# Patient Record
Sex: MALE | Race: BLACK OR AFRICAN AMERICAN
[De-identification: names, ages, dates, MRNs, and addresses within clinical notes are randomized per-mention and may not be internally consistent; named-entity substitution may affect disease eponyms.]

---

## 2018-01-14 ENCOUNTER — HOSPITAL ENCOUNTER (EMERGENCY)
Dept: HOSPITAL 62 - ER | Age: 33
Discharge: HOME | End: 2018-01-14
Payer: OTHER GOVERNMENT

## 2018-01-14 VITALS — DIASTOLIC BLOOD PRESSURE: 49 MMHG | SYSTOLIC BLOOD PRESSURE: 116 MMHG

## 2018-01-14 DIAGNOSIS — R09.81: Primary | ICD-10-CM

## 2018-01-14 DIAGNOSIS — F17.200: ICD-10-CM

## 2018-01-14 DIAGNOSIS — R05: ICD-10-CM

## 2018-01-14 DIAGNOSIS — R50.9: ICD-10-CM

## 2018-01-14 PROCEDURE — 87880 STREP A ASSAY W/OPTIC: CPT

## 2018-01-14 PROCEDURE — 71046 X-RAY EXAM CHEST 2 VIEWS: CPT

## 2018-01-14 PROCEDURE — 99284 EMERGENCY DEPT VISIT MOD MDM: CPT

## 2018-01-14 PROCEDURE — 87070 CULTURE OTHR SPECIMN AEROBIC: CPT

## 2018-01-14 NOTE — ER DOCUMENT REPORT
ED General





- General


Chief Complaint: Cold Symptoms


Stated Complaint: COLD SYMPTOMS


Time Seen by Provider: 01/14/18 11:58


Information source: Patient


Notes: 





32-year-old male who presents today with the onset 48 hours ago runny nose, 

congestion, cough, body aches, nausea, sore throat, with a mild frontal 

headache without neck stiffness or rash.  Patient states positive sick 

contacts.  Patient states he did have a little blood-tinged sputum when he 

coughed.  He denies any difficulty breathing or swallowing which is states pain 

with swallowing.  There is been no drooling or trismus described by patient or 

relative.


TRAVEL OUTSIDE OF THE [place] IN LAST 30 DAYS: No





- HPI


Onset: Other - See above


Onset/Duration: Gradual


Quality of pain: Achy


Severity: Mild


Pain Level: 1


Associated symptoms: Other - See above


Exacerbated by: Denies


Relieved by: Denies


Similar symptoms previously: Yes


Recently seen / treated by doctor: No





- Related Data


Allergies/Adverse Reactions: 


 





No Known Allergies Allergy (Unverified 01/14/18 11:25)


 











Past Medical History





- General


Information source: Patient





- Social History


Smoking Status: Current Every Day Smoker


Cigarette use (# per day): No


Chew tobacco use (# tins/day): No


Smoking Education Provided: No


Frequency of alcohol use: Occasional


Drug Abuse: None


Family History: Reviewed & Not Pertinent


Patient has suicidal ideation: No


Patient has homicidal ideation: No


Renal/ Medical History: Denies: Hx Peritoneal Dialysis


Past Surgical History: Reports: Hx Orthopedic Surgery - jaw, hip, nose, right 

hand





Review of Systems





- Review of Systems


Constitutional: denies: Fever


EENT: Nose congestion, Nose discharge.  denies: Eye discharge, Ear pain, Sinus 

pressure, Sinus discharge


Cardiovascular: denies: Chest pain, Palpitations


Respiratory: Cough.  denies: Short of breath


Gastrointestinal: Vomiting.  denies: Diarrhea


Genitourinary: denies: Dysuria


Musculoskeletal: denies: Leg swelling


Skin: Other - no hives.  denies: Rash


Neurological/Psychological: Other - no slurred speech


-: Yes All other systems reviewed and negative





Physical Exam





- Vital signs


Vitals: 


 











Temp Pulse Resp BP Pulse Ox


 


 101.1 F H  124 H  18   118/66   94 


 


 01/14/18 11:27  01/14/18 11:27  01/14/18 11:27  01/14/18 11:27 01/14/18 11:27











Notes: 





Reviewed vital signs and nursing note as charted by RN.





CONSTITUTIONAL: Alert and oriented and responds appropriately to questions. Well

-appearing; well-nourished





HEAD: Normocephalic; atraumatic





EYES: PERRL; Conjunctivae clear, sclerae non-icteric





ENT: Normal nose; bilateral nonpurulent nasal rhinorrhea; moist mucous membranes

; pharynx minimally erythematous with no peritonsillar swelling, midline 

nonswollen uvula present





NECK: Supple without meningismus; non-tender; full painless range of motion; no 

cervical lymphadenopathy, no masses





CARD: Regular rate and rhythm; no murmurs





RESP: Normal chest excursion without splinting or tachypnea; breath sounds 

clear and equal bilaterally; no wheezes, no rhonchi, no rales





ABD/GI: Normal bowel sounds; non-distended; soft, non-tender





BACK: The back appears normal and is non-tender to palpation, there is no CVA 

tenderness





EXT: Normal ROM in all joints; non-tender to palpation; no edema





SKIN: No acute lesions noted





NEURO: Moves all extremities equally; Motor and sensory function intact





PSYCH: The patient's mood and manner are appropriate. Grooming and personal 

hygiene are appropriate.





Course





- Re-evaluation


Re-evalutation: 





01/14/18 12:04


Given the history and physical examination with bilateral nonpurulent rhinorrhea

, sore throat, body aches, with 2 days of symptomatology, very well-appearing 

with no neck stiffness, mild frontal headache, I do believe acute bacterial 

meningitis to be extremely unlikely.  We will order an x-ray of the chest and a 

rapid strep.  Given the onset 48 hours ago I do not see the utility at this 

time of an influenza test.





01/14/18 13:14


Strep is negative





Chest x-ray shows normal heart, normal mediastinum, no fractures, normal lung 

fields, no pneumothorax.





Patient is feeling better with the Tylenol.  Vital signs are stable.  Headache 

has resolved.  Still no neck stiffness or rash.  Patient will be discharged 

home with strict return precautions and follow-up with the primary provider.





- Vital Signs


Vital signs: 


 











Temp Pulse Resp BP Pulse Ox


 


 101.1 F H  124 H  18   118/66   94 


 


 01/14/18 11:27  01/14/18 11:27 01/14/18 11:27 01/14/18 11:27  01/14/18 11:27














Discharge





- Discharge


Clinical Impression: 


 Nasal congestion, Cough





Fever


Qualifiers:


 Fever type: unspecified Qualified Code(s): R50.9 - Fever, unspecified





Condition: Good


Disposition: HOME, SELF-CARE


Additional Instructions: 


Come back immediately with any worsening cough, neck stiffness, rash, change in 

mental status, weakness or numbness, or any other acute problems.

## 2018-01-14 NOTE — RADIOLOGY REPORT (SQ)
EXAM DESCRIPTION:  CHEST PA/LAT



COMPLETED DATE/TIME:  1/14/2018 12:20 pm



REASON FOR STUDY:  40, cough and fever



COMPARISON:  None.



EXAM PARAMETERS:  NUMBER OF VIEWS: two views

TECHNIQUE: Digital Frontal and Lateral radiographic views of the chest acquired.

RADIATION DOSE: NA

LIMITATIONS: none



FINDINGS:  LUNGS AND PLEURA: No opacities, masses or pneumothorax. No pleural effusion.

MEDIASTINUM AND HILAR STRUCTURES: No masses or contour abnormalities.

HEART AND VASCULAR STRUCTURES: Heart normal size.  No evidence for failure.

BONES: No acute findings.

HARDWARE: None in the chest.

OTHER: No other significant finding.



IMPRESSION:  NO SIGNIFICANT RADIOGRAPHIC FINDING IN THE CHEST.



TECHNICAL DOCUMENTATION:  JOB ID:  [id number]

 2011 [employer]- All Rights Reserved

## 2019-01-11 ENCOUNTER — HOSPITAL ENCOUNTER (EMERGENCY)
Dept: HOSPITAL 62 - ER | Age: 34
Discharge: HOME | End: 2019-01-11
Payer: COMMERCIAL

## 2019-01-11 VITALS — SYSTOLIC BLOOD PRESSURE: 165 MMHG | DIASTOLIC BLOOD PRESSURE: 76 MMHG
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
## 2019-01-11 DIAGNOSIS — S61.411A: Primary | ICD-10-CM

## 2019-01-11 PROCEDURE — 99283 EMERGENCY DEPT VISIT LOW MDM: CPT

## 2019-01-11 PROCEDURE — 12001 RPR S/N/AX/GEN/TRNK 2.5CM/<: CPT

## 2019-01-11 PROCEDURE — 73130 X-RAY EXAM OF HAND: CPT

## 2019-01-11 PROCEDURE — S0119 ONDANSETRON 4 MG: HCPCS

## 2019-01-11 NOTE — RADIOLOGY REPORT (SQ)
EXAM DESCRIPTION: 



XR HAND 3 OR MORE VIEWS



COMPLETED DATE/TME:  01/11/2019 04:52



CLINICAL HISTORY: 



33 years, Male, drill injury to hand, swelling, pain, wound



COMPARISON:

None.



NUMBER OF VIEWS:

3



TECHNIQUE:

3 view right hand



LIMITATIONS:

None.



FINDINGS:



Soft tissue swelling with minimal soft tissue gas along the

dorsal ulnar aspect of the hand. No acute osseous abnormality. No

radiopaque foreign body.



IMPRESSION:



Soft tissue injury with no acute osseous abnormality

 



copyright 2011 [employer]- All Rights Reserved

## 2019-01-11 NOTE — ER DOCUMENT REPORT
ED Wound





- General


Chief Complaint: Laceration


Stated Complaint: HAND INJURY


Time Seen by Provider: 01/11/19 04:47


Notes: 


Patient is a 33-year-old male that comes emergency department for chief 

complaint of right hand injury and wound.  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 

hydraulic machine, he states he hit his hand against a drill bit portion of the 

machine, this caused a laceration, pain, swelling.  He states that he put some 

sort of glue over the wound and then bandaged it with Coban from a first-aid 

kit, afterwards his fifth digit started going numb, he did make the bandage very

tight.  He is not on blood thinners, he denies history of diabetes.  Tetanus is 

up-to-date.  Denies any other complaints.





TRAVEL OUTSIDE OF THE [place] IN LAST 30 DAYS: No





- Related Data


Allergies/Adverse Reactions: 


                                        





No Known Allergies Allergy (Unverified 01/14/18 11:25)


   











Past Medical History





- General


Information source: Patient





- Social History


Smoking Status: Never Smoker


Frequency of alcohol use: None


Drug Abuse: None


Lives with: Family


Family History: Reviewed & Not Pertinent


Renal/ Medical History: Denies: Hx Peritoneal Dialysis


Musculoskeletal Medical History: Reports Hx Musculoskeletal Deformity, Reports 

Hx Musculoskeletal Trauma


Past Surgical History: Reports: Hx Orthopedic Surgery - jaw, hip, nose, right 

hand





- Immunizations


Immunizations up to date: Yes


Hx Diphtheria, Pertussis, Tetanus Vaccination: Yes





Review of Systems





- Review of Systems


Constitutional: No symptoms reported


EENT: No symptoms reported


Cardiovascular: No symptoms reported


Respiratory: No symptoms reported


Gastrointestinal: No symptoms reported


Genitourinary: No symptoms reported


Male Genitourinary: No symptoms reported


Musculoskeletal: See HPI


Skin: See HPI


Hematologic/Lymphatic: No symptoms reported


Neurological/Psychological: No symptoms reported





Physical Exam





- Vital signs


Vitals: 


                                        











Temp Pulse Resp BP Pulse Ox


 


 98.3 F   96   22 H  165/76 H  95 


 


 01/11/19 04:38  01/11/19 04:38  01/11/19 04:38  01/11/19 04:38  01/11/19 04:38














- Notes


Notes: 





GENERAL: Alert, interacts well. No acute distress.


HEAD: Normocephalic, atraumatic.


EYES: Pupils equal, round, and reactive to light. Extraocular movements intact.


ENT: Oral mucosa moist, tongue midline. Oropharynx unremarkable. Airway patent. 

Nares patent, no nasal septal hematoma, TM's intact.


NECK: Full range of motion. Supple. Trachea midline.


LUNGS: Clear to auscultation bilaterally, no wheezes, rales, or rhonchi. No 

respiratory distress.


HEART: Regular rate and rhythm. No murmur


ABDOMEN: Soft, non-tender. Non-distended. Bowel sounds present in all 4 quadra

nts.


GENITOURINARY: Deferred


EXTREMITIES: Soft tissue swelling of the right hand with a 1.5 cm irregular 

laceration horizontally over the dorsum of the hand.  This is over the fourth 

and fifth MCP area.  Range of motion of the hand intact, sensation intact, 

capillary refill intact.  Patient reporting chronic slight weakness of the fifth

digit, reports previous injury, states baseline.  No other injuries noted.  

Normal wrist, snuffbox, forearm, elbow exam.


BACK: no cervical, thoracic, lumbar midline tenderness. No saddle anesthesia, 

normal distal neurovascular exam. 


NEUROLOGICAL: Alert and oriented x3. Normal speech. [cranial nerves II through 

XII grossly intact]. 


PSYCH: Normal affect, normal mood.


SKIN: Warm, dry, normal turgor. No rashes or lesions noted.





Course





- Re-evaluation


Re-evalutation: 


X-rays negative for acute findings.  I cleaned out all of the glue that patient 

placed over this, irrigated, explored, closed the wound.  Placed on prophylactic

antibiotic.  Provided with work-release.  Discussed wound care and return 

precautions.  Patient states understanding and agreement.





- Vital Signs


Vital signs: 


                                        











Temp Pulse Resp BP Pulse Ox


 


 98.3 F   96   22 H  165/76 H  95 


 


 01/11/19 04:38  01/11/19 04:38  01/11/19 04:38  01/11/19 04:38  01/11/19 04:38














Procedures





- Laceration/Wound Repair


  ** Right dorsal hand


Wound length (cm): 1.5


Wound's Depth, Shape: Irregular


Laceration pre-procedure: Sterile PPE donned, Sterile drapes applied, Shur-Clens

applied


Anesthetic type: 1% Lidocaine


Volume Anesthetic (mLs): 4


Wound explored: Clean, No foreign body removed


Irrigated w/ Saline (mLs): 30


Wound Repaired With: Sutures


Suture Size/Type: 5:0, Nylon


Number of Sutures: 3


Layer Closure?: No


Post-procedure wound care: Sterile dressing applied


Post-procedure NV exam normal: Yes


Complications: No





Discharge





- Discharge


Clinical Impression: 


Laceration of right hand


Qualifiers:


 Encounter type: initial encounter Foreign body presence: without foreign body 

Qualified Code(s): S61.411A - Laceration without foreign body of right hand, 

initial encounter





Injury of right hand


Qualifiers:


 Encounter type: initial encounter Qualified Code(s): S69.91XA - Unspecified 

injury of right wrist, hand and finger(s), initial encounter





Condition: Stable


Disposition: HOME, SELF-CARE


Additional Instructions: 


X-ray does not show fracture or foreign body.  The sutures can come out in 5-7 

days.  Keep clean, clean with soap and water, avoid soaking, dab dry.





Take prophylactic antibiotics as prescribed.  You can take Tylenol, ibuprofen, 

or both at the same time for pain.  This can be done up to every 6 hours (1000 

mg Tylenol and 600 mg ibuprofen). 





Follow-up with primary care.  Return immediately for any concerning symptoms 

including developing pain, redness, discolored discharge, fever/chills, or any 

other concerning symptoms.





Prescriptions: 


Cephalexin Monohydrate [Keflex 500 mg Capsule] 500 mg PO TID 5 Days #15 capsule


Forms:  Return to Work

## 2019-06-04 ENCOUNTER — HOSPITAL ENCOUNTER (INPATIENT)
Dept: HOSPITAL 62 - ER | Age: 34
LOS: 4 days | Discharge: HOME | DRG: 195 | End: 2019-06-08
Attending: INTERNAL MEDICINE | Admitting: INTERNAL MEDICINE
Payer: COMMERCIAL

## 2019-06-04 DIAGNOSIS — R91.8: ICD-10-CM

## 2019-06-04 DIAGNOSIS — K59.00: ICD-10-CM

## 2019-06-04 DIAGNOSIS — J18.9: Primary | ICD-10-CM

## 2019-06-04 DIAGNOSIS — F17.290: ICD-10-CM

## 2019-06-04 DIAGNOSIS — Z86.11: ICD-10-CM

## 2019-06-04 LAB
A TYPE INFLUENZA AG: NEGATIVE
ADD MANUAL DIFF: NO
ALBUMIN SERPL-MCNC: 4.4 G/DL (ref 3.5–5)
ALP SERPL-CCNC: 141 U/L (ref 38–126)
ALT SERPL-CCNC: 63 U/L (ref 21–72)
ANION GAP SERPL CALC-SCNC: 12 MMOL/L (ref 5–19)
APPEARANCE UR: CLEAR
APTT PPP: (no result) S
AST SERPL-CCNC: 43 U/L (ref 17–59)
B INFLUENZA AG: NEGATIVE
BASOPHILS # BLD AUTO: 0.1 10^3/UL (ref 0–0.2)
BASOPHILS NFR BLD AUTO: 0.6 % (ref 0–2)
BILIRUB DIRECT SERPL-MCNC: 0.5 MG/DL (ref 0–0.4)
BILIRUB SERPL-MCNC: 1.4 MG/DL (ref 0.2–1.3)
BILIRUB UR QL STRIP: NEGATIVE
BUN SERPL-MCNC: 11 MG/DL (ref 7–20)
CALCIUM: 9.9 MG/DL (ref 8.4–10.2)
CHLORIDE SERPL-SCNC: 96 MMOL/L (ref 98–107)
CO2 SERPL-SCNC: 31 MMOL/L (ref 22–30)
EOSINOPHIL # BLD AUTO: 0 10^3/UL (ref 0–0.6)
EOSINOPHIL NFR BLD AUTO: 0.2 % (ref 0–6)
ERYTHROCYTE [DISTWIDTH] IN BLOOD BY AUTOMATED COUNT: 12.7 % (ref 11.5–14)
GLUCOSE SERPL-MCNC: 104 MG/DL (ref 75–110)
GLUCOSE UR STRIP-MCNC: NEGATIVE MG/DL
HCT VFR BLD CALC: 40.4 % (ref 37.9–51)
HGB BLD-MCNC: 14.1 G/DL (ref 13.5–17)
KETONES UR STRIP-MCNC: NEGATIVE MG/DL
LYMPHOCYTES # BLD AUTO: 1.7 10^3/UL (ref 0.5–4.7)
LYMPHOCYTES NFR BLD AUTO: 13 % (ref 13–45)
MCH RBC QN AUTO: 28.6 PG (ref 27–33.4)
MCHC RBC AUTO-ENTMCNC: 34.8 G/DL (ref 32–36)
MCV RBC AUTO: 82 FL (ref 80–97)
MONOCYTES # BLD AUTO: 1.7 10^3/UL (ref 0.1–1.4)
MONOCYTES NFR BLD AUTO: 12.5 % (ref 3–13)
NEUTROPHILS # BLD AUTO: 9.9 10^3/UL (ref 1.7–8.2)
NEUTS SEG NFR BLD AUTO: 73.7 % (ref 42–78)
NITRITE UR QL STRIP: NEGATIVE
PH UR STRIP: 5 [PH] (ref 5–9)
PLATELET # BLD: 288 10^3/UL (ref 150–450)
POTASSIUM SERPL-SCNC: 3.7 MMOL/L (ref 3.6–5)
PROT SERPL-MCNC: 7.8 G/DL (ref 6.3–8.2)
PROT UR STRIP-MCNC: NEGATIVE MG/DL
RBC # BLD AUTO: 4.92 10^6/UL (ref 4.35–5.55)
SODIUM SERPL-SCNC: 138.5 MMOL/L (ref 137–145)
SP GR UR STRIP: 1.03
TOTAL CELLS COUNTED % (AUTO): 100 %
UROBILINOGEN UR-MCNC: 4 MG/DL (ref ?–2)
WBC # BLD AUTO: 13.4 10^3/UL (ref 4–10.5)

## 2019-06-04 PROCEDURE — 87206 SMEAR FLUORESCENT/ACID STAI: CPT

## 2019-06-04 PROCEDURE — S0119 ONDANSETRON 4 MG: HCPCS

## 2019-06-04 PROCEDURE — 94640 AIRWAY INHALATION TREATMENT: CPT

## 2019-06-04 PROCEDURE — 94799 UNLISTED PULMONARY SVC/PX: CPT

## 2019-06-04 PROCEDURE — 85610 PROTHROMBIN TIME: CPT

## 2019-06-04 PROCEDURE — 86701 HIV-1ANTIBODY: CPT

## 2019-06-04 PROCEDURE — 88342 IMHCHEM/IMCYTCHM 1ST ANTB: CPT

## 2019-06-04 PROCEDURE — 87101 SKIN FUNGI CULTURE: CPT

## 2019-06-04 PROCEDURE — 85730 THROMBOPLASTIN TIME PARTIAL: CPT

## 2019-06-04 PROCEDURE — 81001 URINALYSIS AUTO W/SCOPE: CPT

## 2019-06-04 PROCEDURE — 31628 BRONCHOSCOPY/LUNG BX EACH: CPT

## 2019-06-04 PROCEDURE — 85025 COMPLETE CBC W/AUTO DIFF WBC: CPT

## 2019-06-04 PROCEDURE — 80053 COMPREHEN METABOLIC PANEL: CPT

## 2019-06-04 PROCEDURE — S0164 INJECTION PANTROPRAZOLE: HCPCS

## 2019-06-04 PROCEDURE — 87015 SPECIMEN INFECT AGNT CONCNTJ: CPT

## 2019-06-04 PROCEDURE — 87070 CULTURE OTHR SPECIMN AEROBIC: CPT

## 2019-06-04 PROCEDURE — 71260 CT THORAX DX C+: CPT

## 2019-06-04 PROCEDURE — 88305 TISSUE EXAM BY PATHOLOGIST: CPT

## 2019-06-04 PROCEDURE — 36415 COLL VENOUS BLD VENIPUNCTURE: CPT

## 2019-06-04 PROCEDURE — 71045 X-RAY EXAM CHEST 1 VIEW: CPT

## 2019-06-04 PROCEDURE — 85652 RBC SED RATE AUTOMATED: CPT

## 2019-06-04 PROCEDURE — 87040 BLOOD CULTURE FOR BACTERIA: CPT

## 2019-06-04 PROCEDURE — 99284 EMERGENCY DEPT VISIT MOD MDM: CPT

## 2019-06-04 PROCEDURE — 87116 MYCOBACTERIA CULTURE: CPT

## 2019-06-04 PROCEDURE — 82962 GLUCOSE BLOOD TEST: CPT

## 2019-06-04 PROCEDURE — 88341 IMHCHEM/IMCYTCHM EA ADD ANTB: CPT

## 2019-06-04 PROCEDURE — 74022 RADEX COMPL AQT ABD SERIES: CPT

## 2019-06-04 PROCEDURE — 31622 DX BRONCHOSCOPE/WASH: CPT

## 2019-06-04 PROCEDURE — 87804 INFLUENZA ASSAY W/OPTIC: CPT

## 2019-06-04 PROCEDURE — 87205 SMEAR GRAM STAIN: CPT

## 2019-06-04 PROCEDURE — 76000 FLUOROSCOPY <1 HR PHYS/QHP: CPT

## 2019-06-04 RX ADMIN — PIPERACILLIN AND TAZOBACTAM SCH MLS/HR: 3; .375 INJECTION, POWDER, LYOPHILIZED, FOR SOLUTION INTRAVENOUS; PARENTERAL at 23:55

## 2019-06-04 RX ADMIN — Medication SCH ML: at 23:56

## 2019-06-04 RX ADMIN — HEPARIN SODIUM SCH UNIT: 5000 INJECTION, SOLUTION INTRAVENOUS; SUBCUTANEOUS at 23:55

## 2019-06-04 NOTE — RADIOLOGY REPORT (SQ)
EXAM DESCRIPTION:  CT CHEST WITH



COMPLETED DATE/TIME:  6/4/2019 6:08 pm



REASON FOR STUDY:  radiologist recommended masslike structure rul



COMPARISON:  Same day chest radiograph



TECHNIQUE:  CT scan of the chest performed using helical scanning technique with dynamic intravenous 
contrast injection.  Images reviewed with lung, soft tissue and bone windows.  Reconstructed coronal 
and sagittal MPR and MIP images reviewed.  All images stored on PACS.

All CT scanners at this facility use dose modulation, iterative reconstruction, and/or weight based d
osing when appropriate to reduce radiation dose to as low as reasonably achievable (ALARA).

[facility]: Dose Right  [facility]: CareDose    [facility]: Dose Right    [facility]: Teradose 4D    [facility]: Smart Technologies



CONTRAST TYPE AND DOSE:  contrast/concentration: Isovue 350.00 mg/ml; Total Contrast Delivered: 80.0 
ml; Total Saline Delivered: 55.0 ml



RENAL FUNCTION:  None required. The patient is less than 50 years old.



RADIATION DOSE:  CT Rad equipment meets quality standard of care and radiation dose reduction techniq
ues were employed. CTDIvol: 19.8 mGy. DLP: 754 mGy-cm. .



LIMITATIONS:  None.



FINDINGS:  LUNGS AND PLEURA: There is a dense masslike consolidation of the apical right upper lobe i
n keeping with findings of prior radiograph.

HILAR AND MEDIASTINAL STRUCTURES: No identified masses or abnormal nodes.

HEART AND VASCULAR STRUCTURES: No aneurysm or dissection.  No central pulmonary emboli.  No pericardi
al effusion.

HARDWARE: None in the chest.

UPPER ABDOMEN: No significant findings.  Limited exam.

THYROID AND OTHER SOFT TISSUES: No masses.  No adenopathy.

BONES: No significant finding.

OTHER: No other significant finding.



IMPRESSION:  There is a dense, masslike consolidation of the apical right upper lobe in keeping with 
findings of prior radiograph.  This may be infectious in nature with the appropriate clinical present
ation, however underlying mass is not excluded.  Recommend follow-up radiographs in 6 to 8 weeks to d
ocument complete resolution.  Bronchoscopic or percutaneous tissue sampling may be pursued if there i
s no infectious presentation.



TECHNICAL DOCUMENTATION:  JOB ID:  [id number]

Quality ID # [id number]: Final reports with documentation of one or more dose reduction techniques (e.g., Au
tomated exposure control, adjustment of the mA and/or kV according to patient size, use of iterative 
reconstruction technique)

 2011 [employer]- All Rights Reserved



Reading location - IP/workstation name: [email or web address]

## 2019-06-04 NOTE — ER DOCUMENT REPORT
ED Medical Screen (RME)





- General


Chief Complaint: Flu Symptoms


Stated Complaint: VOMITING/FEVER


Time Seen by Provider: 06/04/19 15:53


Mode of Arrival: Ambulatory


Information source: Patient


Notes: 





Patient presents emergency department with complaints of headache fever vomiting

nausea and abdominal pain since Sunday.  Reports fever up to 103.  Reports he 

took some nausea medicine yesterday.  Has not taken anything today.  No other 

complaints abdomen tender to palpate











I have greeted and performed a rapid initial assessment of this patient.  A 

comprehensive ED assessment and evaluation of the patient, analysis of test 

results and completion of the medical decision making process will be conducted 

by additional ED providers.


Dictation of this chart was performed using voice recognition software; 

therefore, there may be some unintended grammatical errors.


TRAVEL OUTSIDE OF THE [place] IN LAST 30 DAYS: No





- Related Data


Allergies/Adverse Reactions: 


                                        





No Known Allergies Allergy (Verified 06/04/19 14:43)


   











Past Medical History





- Social History


Chew tobacco use (# tins/day): No


Frequency of alcohol use: Social


Drug Abuse: None


Renal/ Medical History: Denies: Hx Peritoneal Dialysis


Musculoskeltal Medical History: Reports Hx Musculoskeletal Deformity, Reports Hx

Musculoskeletal Trauma


Past Surgical History: Reports: Hx Orthopedic Surgery - jaw, hip, nose, right 

hand





- Immunizations


Immunizations up to date: Yes


Hx Diphtheria, Pertussis, Tetanus Vaccination: Yes





Physical Exam





- Vital signs


Vitals: 





                                        











Temp Pulse Resp BP Pulse Ox


 


 100.2 F   103 H  16   122/52 L  94 


 


 06/04/19 15:02  06/04/19 15:02  06/04/19 15:02  06/04/19 15:02  06/04/19 15:02














Course





- Vital Signs


Vital signs: 





                                        











Temp Pulse Resp BP Pulse Ox


 


 100.2 F   103 H  16   122/52 L  94 


 


 06/04/19 15:02  06/04/19 15:02  06/04/19 15:02  06/04/19 15:02  06/04/19 15:02
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
## 2019-06-04 NOTE — RADIOLOGY REPORT (SQ)
EXAM DESCRIPTION:  ACUTE ABDOMEN SERIES



COMPLETED DATE/TIME:  6/4/2019 5:10 pm



REASON FOR STUDY:  Abdominal pain



COMPARISON:  Chest radiograph, 1/14/2018



NUMBER OF VIEWS:  Three views.



TECHNIQUE:  Frontal chest, supine abdomen and upright/decubitus abdomen radiographic images acquired.




LIMITATIONS:  None.



FINDINGS:  CHEST: There is a large masslike opacity of the suprahilar right upper lobe measuring at l
east 6.5 cm.

FREE AIR: None. No abnormal gas collections.

BOWEL GAS PATTERN: Nonobstructive pattern. No dilated loops or air fluid levels.

CALCIFICATIONS: No suspicious calcifications.

HARDWARE: None in the abdomen.

SOFT TISSUES: No gross mass or suggestion of organomegaly.

BONES: No acute fracture.  No worrisome bone lesions.

OTHER: No other significant finding.



IMPRESSION:  1.  Large masslike opacity of the suprahilar right upper lobe measuring at least 6.5 cm.
  Recommend contrast-enhanced CT to further evaluate.

2.  Nonobstructive pattern of bowel gas with gas and stool present to the rectum.  No free air in the
 abdomen.



TECHNICAL DOCUMENTATION:  JOB ID:  [id number]

 2011 [employer]- All Rights Reserved



Reading location - IP/workstation name: [email or web address]

## 2019-06-05 LAB
ADD MANUAL DIFF: NO
ALBUMIN SERPL-MCNC: 3.9 G/DL (ref 3.5–5)
ALP SERPL-CCNC: 130 U/L (ref 38–126)
ALT SERPL-CCNC: 56 U/L (ref 21–72)
ANION GAP SERPL CALC-SCNC: 11 MMOL/L (ref 5–19)
AST SERPL-CCNC: 32 U/L (ref 17–59)
BASOPHILS # BLD AUTO: 0.1 10^3/UL (ref 0–0.2)
BASOPHILS NFR BLD AUTO: 0.5 % (ref 0–2)
BILIRUB DIRECT SERPL-MCNC: 0.6 MG/DL (ref 0–0.4)
BILIRUB SERPL-MCNC: 1.5 MG/DL (ref 0.2–1.3)
BUN SERPL-MCNC: 11 MG/DL (ref 7–20)
CALCIUM: 9.5 MG/DL (ref 8.4–10.2)
CHLORIDE SERPL-SCNC: 97 MMOL/L (ref 98–107)
CO2 SERPL-SCNC: 32 MMOL/L (ref 22–30)
EOSINOPHIL # BLD AUTO: 0.1 10^3/UL (ref 0–0.6)
EOSINOPHIL NFR BLD AUTO: 0.6 % (ref 0–6)
ERYTHROCYTE [DISTWIDTH] IN BLOOD BY AUTOMATED COUNT: 12.7 % (ref 11.5–14)
GLUCOSE SERPL-MCNC: 103 MG/DL (ref 75–110)
HCT VFR BLD CALC: 38.2 % (ref 37.9–51)
HGB BLD-MCNC: 13.1 G/DL (ref 13.5–17)
LYMPHOCYTES # BLD AUTO: 1.6 10^3/UL (ref 0.5–4.7)
LYMPHOCYTES NFR BLD AUTO: 15.9 % (ref 13–45)
MCH RBC QN AUTO: 28.1 PG (ref 27–33.4)
MCHC RBC AUTO-ENTMCNC: 34.4 G/DL (ref 32–36)
MCV RBC AUTO: 82 FL (ref 80–97)
MONOCYTES # BLD AUTO: 1.4 10^3/UL (ref 0.1–1.4)
MONOCYTES NFR BLD AUTO: 14 % (ref 3–13)
NEUTROPHILS # BLD AUTO: 6.9 10^3/UL (ref 1.7–8.2)
NEUTS SEG NFR BLD AUTO: 69 % (ref 42–78)
PLATELET # BLD: 277 10^3/UL (ref 150–450)
POTASSIUM SERPL-SCNC: 3.3 MMOL/L (ref 3.6–5)
PROT SERPL-MCNC: 7 G/DL (ref 6.3–8.2)
RBC # BLD AUTO: 4.66 10^6/UL (ref 4.35–5.55)
SODIUM SERPL-SCNC: 140.2 MMOL/L (ref 137–145)
TOTAL CELLS COUNTED % (AUTO): 100 %
WBC # BLD AUTO: 10 10^3/UL (ref 4–10.5)

## 2019-06-05 RX ADMIN — Medication SCH ML: at 05:15

## 2019-06-05 RX ADMIN — PIPERACILLIN AND TAZOBACTAM SCH MLS/HR: 3; .375 INJECTION, POWDER, LYOPHILIZED, FOR SOLUTION INTRAVENOUS; PARENTERAL at 22:10

## 2019-06-05 RX ADMIN — IPRATROPIUM BROMIDE AND ALBUTEROL SULFATE SCH ML: 2.5; .5 SOLUTION RESPIRATORY (INHALATION) at 01:25

## 2019-06-05 RX ADMIN — HEPARIN SODIUM SCH UNIT: 5000 INJECTION, SOLUTION INTRAVENOUS; SUBCUTANEOUS at 17:01

## 2019-06-05 RX ADMIN — Medication SCH ML: at 22:11

## 2019-06-05 RX ADMIN — PANTOPRAZOLE SODIUM SCH MG: 40 INJECTION, POWDER, FOR SOLUTION INTRAVENOUS at 09:58

## 2019-06-05 RX ADMIN — IPRATROPIUM BROMIDE AND ALBUTEROL SULFATE SCH ML: 2.5; .5 SOLUTION RESPIRATORY (INHALATION) at 08:13

## 2019-06-05 RX ADMIN — PIPERACILLIN AND TAZOBACTAM SCH MLS/HR: 3; .375 INJECTION, POWDER, LYOPHILIZED, FOR SOLUTION INTRAVENOUS; PARENTERAL at 05:15

## 2019-06-05 RX ADMIN — IPRATROPIUM BROMIDE AND ALBUTEROL SULFATE SCH ML: 2.5; .5 SOLUTION RESPIRATORY (INHALATION) at 17:19

## 2019-06-05 RX ADMIN — PIPERACILLIN AND TAZOBACTAM SCH MLS/HR: 3; .375 INJECTION, POWDER, LYOPHILIZED, FOR SOLUTION INTRAVENOUS; PARENTERAL at 09:57

## 2019-06-05 RX ADMIN — PIPERACILLIN AND TAZOBACTAM SCH MLS/HR: 3; .375 INJECTION, POWDER, LYOPHILIZED, FOR SOLUTION INTRAVENOUS; PARENTERAL at 17:01

## 2019-06-05 RX ADMIN — PANTOPRAZOLE SODIUM SCH MG: 40 INJECTION, POWDER, FOR SOLUTION INTRAVENOUS at 00:56

## 2019-06-05 RX ADMIN — HEPARIN SODIUM SCH UNIT: 5000 INJECTION, SOLUTION INTRAVENOUS; SUBCUTANEOUS at 22:11

## 2019-06-05 RX ADMIN — HEPARIN SODIUM SCH UNIT: 5000 INJECTION, SOLUTION INTRAVENOUS; SUBCUTANEOUS at 05:15

## 2019-06-05 RX ADMIN — Medication SCH ML: at 17:02
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
## 2019-06-05 NOTE — PROGRESS NOTE E
Progress Note



NAME: EJ TERRY JR

MRN: [id number]

: 1985             AGE: 34Y

DATE:  2019           ROOM: 335



SUBJECTIVE:

The patient is a 34-year-old -American male who came with nausea,

vomiting, and fever.  The temperature was 102 degrees Fahrenheit.  X-ray

showing a mass like density in the right upper lobe.  CT scan confirmed

the mass like density involving the right upper lobe about 4.5 x 4 cm

size.  The patient claimed that he is doing well over the last 24 hours. 

Still coughing yellow green phlegm.  Denies any hemoptysis, chest pain, or

worsening dyspnea.  Denies any nausea, vomiting, or diarrhea.



OBJECTIVE:

GENERAL:  The patient is awake, alert, coherent, oriented x3.



VITAL SIGNS:  Temperature of 99.7 degrees Fahrenheit with a T-max of 99.7

degrees Fahrenheit, blood pressure is 120/57, heart rate of 97,

respiratory is 16, saturation 97% on room air.



EYES:  No jaundice or pallor.



EARS, NOSE, AND THROAT:  No ear drainage.  No nasal discharge.



CHEST AND LUNGS:  No wheezing, no rhonchi, no coarse crackles.



CARDIOVASCULAR:  S1, S2 distinct.  Normal rate and regular rhythm.



ABDOMEN:  Flabby, positive bowel sounds, soft, nondistended, nontender.



EXTREMITIES:  No joint swelling, no cellulitis.



LABORATORY DATA:

Bacterial culture was sent yesterday, but sputum AFB culture and smear was

not sent yesterday, it was sent this morning.  We will repeat the sputum

AFB again tonight for the second sputum AFB culture and then the third one

will be tomorrow morning to help reduce the length of stay of the patient.

The sputum AFB culture tonight will be as good as the one in the morning,to make
3 sets of sputum AFB culture.



ASSESSMENT:

1. LUNG MASS/CONSOLIDATION RIGHT UPPER LOBE, POSSIBLE PNEUMONIC PROCESS,

POSSIBLE ATYPICAL MYCOBACTERIUM INFECTION VERSUS UNDERLYING MALIGNANCY. 

RULE OUT PULMONARY TUBERCULOSIS.



PLAN/RECOMMENDATION:

1.  We will do a sputum AFB culture tonight and another sputum AFB culture

tomorrow morning.  Once all 3 sets of sputum AFB cultures are negative,

the respiratory isolation can be discontinued.

2.  We will plan to continue IV Zosyn and vancomycin for now until we have

the results of the smears/ cultures.

3.  We will plan to do a bronchoscopy Friday.



If you have any questions, please feel free to call me.





DICTATING PHYSICIAN:  FAROOQ THOMPSON M.D.





[id number]                  DT: 2019

PHY#: [id number]            DD: 2019

ID:   [id number]           JOB#: [id number]       ACCT: [id number]



cc:

>







MTDD

## 2019-06-06 LAB
ADD MANUAL DIFF: NO
ALBUMIN SERPL-MCNC: 3.9 G/DL (ref 3.5–5)
ALP SERPL-CCNC: 119 U/L (ref 38–126)
ALT SERPL-CCNC: 40 U/L (ref 21–72)
ANION GAP SERPL CALC-SCNC: 11 MMOL/L (ref 5–19)
APTT BLD: 37.1 SEC (ref 23.5–35.8)
AST SERPL-CCNC: 26 U/L (ref 17–59)
BASOPHILS # BLD AUTO: 0 10^3/UL (ref 0–0.2)
BASOPHILS NFR BLD AUTO: 0.4 % (ref 0–2)
BILIRUB DIRECT SERPL-MCNC: 0.3 MG/DL (ref 0–0.4)
BILIRUB SERPL-MCNC: 0.6 MG/DL (ref 0.2–1.3)
BUN SERPL-MCNC: 10 MG/DL (ref 7–20)
CALCIUM: 9.6 MG/DL (ref 8.4–10.2)
CHLORIDE SERPL-SCNC: 99 MMOL/L (ref 98–107)
CO2 SERPL-SCNC: 32 MMOL/L (ref 22–30)
EOSINOPHIL # BLD AUTO: 0.1 10^3/UL (ref 0–0.6)
EOSINOPHIL NFR BLD AUTO: 0.8 % (ref 0–6)
ERYTHROCYTE [DISTWIDTH] IN BLOOD BY AUTOMATED COUNT: 13.1 % (ref 11.5–14)
GLUCOSE SERPL-MCNC: 115 MG/DL (ref 75–110)
HCT VFR BLD CALC: 37.5 % (ref 37.9–51)
HGB BLD-MCNC: 12.9 G/DL (ref 13.5–17)
INR PPP: 1.03
LYMPHOCYTES # BLD AUTO: 1.7 10^3/UL (ref 0.5–4.7)
LYMPHOCYTES NFR BLD AUTO: 16.7 % (ref 13–45)
MCH RBC QN AUTO: 28.4 PG (ref 27–33.4)
MCHC RBC AUTO-ENTMCNC: 34.4 G/DL (ref 32–36)
MCV RBC AUTO: 83 FL (ref 80–97)
MONOCYTES # BLD AUTO: 1.2 10^3/UL (ref 0.1–1.4)
MONOCYTES NFR BLD AUTO: 12.1 % (ref 3–13)
NEUTROPHILS # BLD AUTO: 7.1 10^3/UL (ref 1.7–8.2)
NEUTS SEG NFR BLD AUTO: 70 % (ref 42–78)
PLATELET # BLD: 348 10^3/UL (ref 150–450)
POTASSIUM SERPL-SCNC: 3.9 MMOL/L (ref 3.6–5)
PROT SERPL-MCNC: 7 G/DL (ref 6.3–8.2)
PROTHROMBIN TIME: 14 SEC (ref 11.4–15.4)
RBC # BLD AUTO: 4.54 10^6/UL (ref 4.35–5.55)
SODIUM SERPL-SCNC: 141.8 MMOL/L (ref 137–145)
TOTAL CELLS COUNTED % (AUTO): 100 %
WBC # BLD AUTO: 10.2 10^3/UL (ref 4–10.5)

## 2019-06-06 RX ADMIN — IPRATROPIUM BROMIDE AND ALBUTEROL SULFATE SCH ML: 2.5; .5 SOLUTION RESPIRATORY (INHALATION) at 16:42

## 2019-06-06 RX ADMIN — HEPARIN SODIUM SCH UNIT: 5000 INJECTION, SOLUTION INTRAVENOUS; SUBCUTANEOUS at 05:59

## 2019-06-06 RX ADMIN — PIPERACILLIN AND TAZOBACTAM SCH MLS/HR: 3; .375 INJECTION, POWDER, LYOPHILIZED, FOR SOLUTION INTRAVENOUS; PARENTERAL at 11:09

## 2019-06-06 RX ADMIN — HEPARIN SODIUM SCH: 5000 INJECTION, SOLUTION INTRAVENOUS; SUBCUTANEOUS at 22:50

## 2019-06-06 RX ADMIN — HEPARIN SODIUM SCH: 5000 INJECTION, SOLUTION INTRAVENOUS; SUBCUTANEOUS at 14:33

## 2019-06-06 RX ADMIN — IPRATROPIUM BROMIDE AND ALBUTEROL SULFATE SCH ML: 2.5; .5 SOLUTION RESPIRATORY (INHALATION) at 07:54

## 2019-06-06 RX ADMIN — Medication SCH ML: at 06:00

## 2019-06-06 RX ADMIN — PIPERACILLIN AND TAZOBACTAM SCH MLS/HR: 3; .375 INJECTION, POWDER, LYOPHILIZED, FOR SOLUTION INTRAVENOUS; PARENTERAL at 20:54

## 2019-06-06 RX ADMIN — PIPERACILLIN AND TAZOBACTAM SCH MLS/HR: 3; .375 INJECTION, POWDER, LYOPHILIZED, FOR SOLUTION INTRAVENOUS; PARENTERAL at 03:29

## 2019-06-06 RX ADMIN — Medication SCH: at 22:50

## 2019-06-06 RX ADMIN — Medication PRN EACH: at 23:14

## 2019-06-06 RX ADMIN — POLYETHYLENE GLYCOL 3350 SCH: 17 POWDER, FOR SOLUTION ORAL at 11:08

## 2019-06-06 RX ADMIN — Medication SCH ML: at 15:59

## 2019-06-06 RX ADMIN — PANTOPRAZOLE SODIUM SCH MG: 40 INJECTION, POWDER, FOR SOLUTION INTRAVENOUS at 11:10

## 2019-06-06 RX ADMIN — HEPARIN SODIUM SCH: 5000 INJECTION, SOLUTION INTRAVENOUS; SUBCUTANEOUS at 06:01

## 2019-06-06 RX ADMIN — IPRATROPIUM BROMIDE AND ALBUTEROL SULFATE SCH ML: 2.5; .5 SOLUTION RESPIRATORY (INHALATION) at 00:31

## 2019-06-06 RX ADMIN — PIPERACILLIN AND TAZOBACTAM SCH MLS/HR: 3; .375 INJECTION, POWDER, LYOPHILIZED, FOR SOLUTION INTRAVENOUS; PARENTERAL at 15:59

## 2019-06-06 NOTE — PROGRESS NOTE
Provider Note


Provider Note: 


ID Consult Note





Asked to review chart. Pt is a 34 year old obese male smoker who previously 

served in [facility]. He was admitted on 6/5/19 with 3 days of fever and 

productive cough associated with headache, body aches, nausea, vomiting. He has 

a history of what sounds like latent tuberculosis infection that was treated in 

2012. His lung exam has been noted to be relatively unremarkable. Acute 

abdominal series was performed in the ED that showed a large masslike opacitiy 

in the suprahilera RUL. CT scan showed a dense masslike consolidation of the 

apical RUL. HIV Ag/Ab test was negative. Influenza antigen test was negative. Pt

was placed on airborne precautions and 3 AFB smears at this point are negative. 

BCx negative x 1 day. He has empirically been given vancomycin and Zosyn. Sputum

culture is pending. 





Impression/Recommendations


With regard to the question of pulmonary TB, his management has been entirely 

appropriate, with airborne precautions until AFB sputum smears could be obtained

and found negative x 3. With acute onset of symptoms and AFB smears x 3 negative

despite immunocompetence and a rather impressive pulmonary lesion, pulmonary TB 

seems much less likely. Airborne precautions can be discontinued.





For community acquired pneumonia, Rocephin/azithromycin would be recommended. 

Vancomycin and Zosyn is broader than is likely necessary and the combination of 

these agents may be associated with increased risk for nephrotoxicity. 





With prominent GI symptoms on presentation, Legionella could be considered. Pt 

is a smoker, and it is more common in warmer months, but it tends to affect 

elderly patients who have more comorbidities and debility. Other supportive but 

nonspecific features, such as hyponatremia or transaminitis, to suggest 

Legionlla are absent. It would be covered by azithromycin. Legionella urine 

antigen can be sent.  





Edwin Renteria MD


[facility] Infectious Diseases, 


pager [phone]

## 2019-06-06 NOTE — PROGRESS NOTE E
Progress Note



NAME: EJ TERRY JR

MRN: [id number]

: 1985             AGE: 34Y

DATE: 2019                    ROOM: 335



SUBJECTIVE:

Patient is a 34-year-old  male who came in with a fever, chills

and coughing yellow-greenish sputum, with a mass-like density involving

the right upper lobe, treated for pneumonia with broad

spectrum antibiotics - IV Zosyn and IV vancomycin.  Sputum bacteria culture

is still pending.  AFB smear negative x3.  Blood cultures no growth after

48 hours.  Patient has been doing well.  White count went down to 10,000

yesterday.  Denies any hemoptysis, chest pain or increasing dyspnea or

worsening dyspnea.  No nausea, vomiting or diarrhea.



OBJECTIVE:

GENERAL:  Patient awake, alert, coherent, oriented x3.  Not in apparent

respiratory distress.



VITAL SIGNS:  Afebrile with a temperature of 98.5, with a T-max of 99.6. 

Blood pressure is 140/77, heart rate is 107, respirations 20 and

saturation 96% on room air.



EYES:  No jaundice or pallor.



EARS, NOSE AND THROAT:  No ear drainage.  No nasal discharge.



CHEST AND LUNGS:  No wheezing, no rhonchi, no coarse crackles.



CARDIOVASCULAR:  S1, S2 distinct.  Normal rate, regular rhythm..



ABDOMEN:  Flabby.  Positive bowel sounds.  Soft, nondistended.



EXTREMITIES:  No joint swelling or cellulitis.



LABORATORY DATA:

There was no CBC or chemistry done today.



ASSESSMENT:

LUNG MASS-LIKE DENSITY, RIGHT UPPER LOBE, POSSIBLY INFLAMMATORY

PROCESS/PNEUMONIC PROCESS.UNDERLYING MALIGNANCY COULD NOT BE COMPLETELY 
EXCLUDED.  Currently on Zosyn and IV vancomycin.  



PLAN/RECOMMENDATIONS:

1.   Patient is scheduled for flexible bronchoscopy tomorrow at 12 noon.

2.   Will put the patient n.p.o. at 4:00AM tomorrow.

3.   Will continue IV Zosyn and IV vancomycin for now.  Will consider

     discontinuing one of the IV antibiotics tomorrow.

4.   Will do CBC, Chem-12, PT/PTT tonight.





DICTATING PHYSICIAN:  FAROOQ THOMPSON M.D.





[id number]                  DT: 2019

PHY#: [id number]            DD: 2019

ID:   [id number]           JOB#: [id number]       ACCT: [id number]



cc:

>







MTDD

## 2019-06-06 NOTE — PDOC PROGRESS REPORT
Subjective


Progress Note for:: 06/05/19


Subjective:: 





Actually resting fairly comfortably.  Still has not had a normal bowel movement.


Reason For Visit: 


LUNG MASS PNEUMONIA








Physical Exam


Vital Signs: 


                                        











Temp Pulse Resp BP Pulse Ox


 


 99 F   98   14   132/67 H  95 


 


 06/05/19 09:00  06/05/19 08:14  06/05/19 11:01  06/05/19 11:01  06/05/19 11:01








                                 Intake & Output











 06/04/19 06/05/19 06/06/19





 06:59 06:59 06:59


 


Intake Total  1230 


 


Balance  1230 


 


Weight  119.1 kg 











General appearance: PRESENT: no acute distress, cooperative, morbidly obese, 

well-developed


Head exam: PRESENT: atraumatic, normocephalic


Eye exam: PRESENT: conjunctiva pink.  ABSENT: scleral icterus


Ear exam: PRESENT: normal external ear exam


Mouth exam: PRESENT: moist, tongue midline


Respiratory exam: PRESENT: chest wall tenderness, symmetrical, unlabored.  

ABSENT: accessory muscle use, rales, rhonchi, tachypnea, wheezes


Cardiovascular exam: PRESENT: RRR, +S1, +S2


GI/Abdominal exam: PRESENT: normal bowel sounds, soft, tenderness - Mild diffuse

nonspecific tenderness, other - Protuberant abdomen


Rectal exam: PRESENT: deferred


Gentrourinary exam: ABSENT: indwelling catheter


Extremities exam: ABSENT: pedal edema


Neurological exam: PRESENT: alert, awake, oriented to person, oriented to place,

oriented to time, oriented to situation, CN II-XII grossly intact


Psychiatric exam: PRESENT: appropriate affect, normal mood.  ABSENT: agitated, 

anxious


Focused psych exam: ABSENT: delusional, restlessness





Results


Laboratory Results: 


                                        





                                 06/05/19 05:55 





                                 06/05/19 05:55 





                                        











  06/04/19 06/04/19 06/04/19





  16:00 16:00 18:20


 


WBC  13.4 H  


 


RBC  4.92  


 


Hgb  14.1  


 


Hct  40.4  


 


MCV  82  


 


MCH  28.6  


 


MCHC  34.8  


 


RDW  12.7  


 


Plt Count  288  


 


Seg Neutrophils %  73.7  


 


Lymphocytes %  13.0  


 


Monocytes %  12.5  


 


Eosinophils %  0.2  


 


Basophils %  0.6  


 


Absolute Neutrophils  9.9 H  


 


Absolute Lymphocytes  1.7  


 


Absolute Monocytes  1.7 H  


 


Absolute Eosinophils  0.0  


 


Absolute Basophils  0.1  


 


Sodium   138.5 


 


Potassium   3.7 


 


Chloride   96 L 


 


Carbon Dioxide   31 H 


 


Anion Gap   12 


 


BUN   11 


 


Creatinine   0.98 


 


Est GFR ( Amer)   > 60 


 


Est GFR (Non-Af Amer)   > 60 


 


Glucose   104 


 


Calcium   9.9 


 


Total Bilirubin   1.4 H 


 


AST   43 


 


ALT   63 


 


Alkaline Phosphatase   141 H 


 


Total Protein   7.8 


 


Albumin   4.4 


 


Urine Color    RAYMOND


 


Urine Appearance    CLEAR


 


Urine pH    5.0


 


Ur Specific Gravity    1.034


 


Urine Protein    NEGATIVE


 


Urine Glucose (UA)    NEGATIVE


 


Urine Ketones    NEGATIVE


 


Urine Blood    MODERATE H


 


Urine Nitrite    NEGATIVE


 


Ur Leukocyte Esterase    NEGATIVE


 


Urine WBC (Auto)    1


 


Urine RBC (Auto)    6














  06/05/19 06/05/19





  05:55 05:55


 


WBC  10.0 


 


RBC  4.66 


 


Hgb  13.1 L 


 


Hct  38.2 


 


MCV  82 


 


MCH  28.1 


 


MCHC  34.4 


 


RDW  12.7 


 


Plt Count  277 


 


Seg Neutrophils %  69.0 


 


Lymphocytes %  15.9 


 


Monocytes %  14.0 H 


 


Eosinophils %  0.6 


 


Basophils %  0.5 


 


Absolute Neutrophils  6.9 


 


Absolute Lymphocytes  1.6 


 


Absolute Monocytes  1.4 


 


Absolute Eosinophils  0.1 


 


Absolute Basophils  0.1 


 


Sodium   140.2


 


Potassium   3.3 L


 


Chloride   97 L


 


Carbon Dioxide   32 H


 


Anion Gap   11


 


BUN   11


 


Creatinine   1.12


 


Est GFR ( Amer)   > 60


 


Est GFR (Non-Af Amer)   > 60


 


Glucose   103


 


Calcium   9.5


 


Total Bilirubin   1.5 H


 


AST   32


 


ALT   56


 


Alkaline Phosphatase   130 H


 


Total Protein   7.0


 


Albumin   3.9


 


Urine Color  


 


Urine Appearance  


 


Urine pH  


 


Ur Specific Gravity  


 


Urine Protein  


 


Urine Glucose (UA)  


 


Urine Ketones  


 


Urine Blood  


 


Urine Nitrite  


 


Ur Leukocyte Esterase  


 


Urine WBC (Auto)  


 


Urine RBC (Auto)  











Impressions: 


                                        





Acute Abdomen Series  06/04/19 16:36


IMPRESSION:  1.  Large masslike opacity of the suprahilar right upper lobe 

measuring at least 6.5 cm.  Recommend contrast-enhanced CT to further evaluate.


2.  Nonobstructive pattern of bowel gas with gas and stool present to the 

rectum.  No free air in the abdomen.


 








Chest CT  06/04/19 17:44


IMPRESSION:  There is a dense, masslike consolidation of the apical right upper 

lobe in keeping with findings of prior radiograph.  This may be infectious in 

nature with the appropriate clinical presentation, however underlying mass is 

not excluded.  Recommend follow-up radiographs in 6 to 8 weeks to document 

complete resolution.  Bronchoscopic or percutaneous tissue sampling may be 

pursued if there is no infectious presentation.


 














Assessment and Plan





- Diagnosis


(1) Mass of upper lobe of right lung


Is this a current diagnosis for this admission?: Yes   


Plan: 


With a history of tuberculosis (that has been treated) there is a question of 

the mass being a tuberculoma.  Acid-fast smears, fungal culture and sputum 

culture have been requested.  The patient is on respiratory isolation at this 

point.  I have reached out to pharmacy to have Dr. Renteria of the infectious 

disease service at [facility] review the case.








(2) History of TB (tuberculosis)


Is this a current diagnosis for this admission?: Yes   


Plan: 


The patient has had treatment for tuberculosis.  If the AFB smears are positive 

then this lesion could be a tuberculoma.  If not then oncology should see the 

patient.








(3) Pneumonia


Qualifiers: 


   Pneumonia type: due to Pneumococcus   Laterality: right   Lung location: u

pper lobe of lung   Qualified Code(s): J13 - Pneumonia due to Streptococcus 

pneumoniae   


Is this a current diagnosis for this admission?: Yes   


Plan: 


The Gram stain revealed gram-positive cocci as well as small amount of gram-

negative and gram-positive bacilli.  The patient is currently on levofloxacin 

for community-acquired pneumonia.  AFB and fungal smears have been ordered as 

well.








(4) Constipation


Qualifiers: 


   Constipation type: unspecified constipation type   Qualified Code(s): K59.00 

- Constipation, unspecified   


Is this a current diagnosis for this admission?: Yes   


Plan: 


The patient reports that he has not had a significant bowel movement for 3 to 4 

days.  I have ordered Dulcolax tablets as well as a bottle of citrate of 

magnesia.  This could be contributing to some of his discomfort.








- Time


Time Spent with patient: Less than 15 minutes


Medications reviewed and adjusted accordingly: Yes

## 2019-06-06 NOTE — PDOC PROGRESS REPORT
Subjective


Progress Note for:: 06/06/19


Subjective:: 


This is 74 years old black male patient with no significant past medical history

except for had been treated for tuberculosis in 2012.  Presented with 3 days 

history of cough of sputum, fever and mild shortness of breath.  His CT scan and

chest x-ray revealed dense masslike consolidation in the right upper lobe.  He 

has also mild leukocytosis of 13,000 which is today it is 10,000.  Patient 

reported some improvement.  He has been started on vancomycin and Zosyn emp

irically.  His sputum Gram stain culture and AFB are pending.  Of note patient 

had been assigned in [facility] 2012 and upon [place] return to [place] diagnosed with 

most probably with latent tuberculosis and treated with anti-tuberculosis.  

Patient reports his history was diagnosed based on PPD otherwise he did not have

signs and symptoms of tuberculosis.


Reason For Visit: 


LUNG MASS PNEUMONIA








Physical Exam


Vital Signs: 


                                        











Temp Pulse Resp BP Pulse Ox


 


 98.3 F   98   18   143/81 H  92 


 


 06/06/19 04:09  06/06/19 07:58  06/06/19 07:58  06/06/19 07:58  06/06/19 07:58








                                 Intake & Output











 06/05/19 06/06/19 06/07/19





 06:59 06:59 06:59


 


Intake Total 1230 1140 


 


Balance 1230 1140 


 


Weight 119.1 kg 119.4 kg 











Respiratory exam: PRESENT: crackles, decreased breath sounds - Right upper lobe





Results


Laboratory Results: 


                                        





                                 06/05/19 05:55 





                                 06/05/19 05:55 








Impressions: 


                                        





Acute Abdomen Series  06/04/19 16:36


IMPRESSION:  1.  Large masslike opacity of the suprahilar right upper lobe 

measuring at least 6.5 cm.  Recommend contrast-enhanced CT to further evaluate.


2.  Nonobstructive pattern of bowel gas with gas and stool present to the 

rectum.  No free air in the abdomen.


 








Chest CT  06/04/19 17:44


IMPRESSION:  There is a dense, masslike consolidation of the apical right upper 

lobe in keeping with findings of prior radiograph.  This may be infectious in 

nature with the appropriate clinical presentation, however underlying mass is 

not excluded.  Recommend follow-up radiographs in 6 to 8 weeks to document 

complete resolution.  Bronchoscopic or percutaneous tissue sampling may be 

pursued if there is no infectious presentation.


 














Assessment and Plan





- Diagnosis


(1) Right upper lobe pneumonia


Is this a current diagnosis for this admission?: Yes   


Plan: 


Patient has leukocytosis, fever and cough so the right upper lobe consolidation 

most probably can be infectious origin.


We will continue the current antibiotics and follow-up his lab.








(2) Constipation


Is this a current diagnosis for this admission?: Yes   


Plan: 


Resolved








(3) History of tuberculosis


Is this a current diagnosis for this admission?: Yes   


Plan: 


Most probably it is latent tuberculosis since patient did not have any signs 

symptoms compatible with tuberculosis.  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 

[place].

## 2019-06-07 PROCEDURE — 0B9C8ZX DRAINAGE OF RIGHT UPPER LUNG LOBE, VIA NATURAL OR ARTIFICIAL OPENING ENDOSCOPIC, DIAGNOSTIC: ICD-10-PCS | Performed by: INTERNAL MEDICINE

## 2019-06-07 PROCEDURE — 0BDC8ZX EXTRACTION OF RIGHT UPPER LUNG LOBE, VIA NATURAL OR ARTIFICIAL OPENING ENDOSCOPIC, DIAGNOSTIC: ICD-10-PCS | Performed by: INTERNAL MEDICINE

## 2019-06-07 PROCEDURE — 0BJ08ZZ INSPECTION OF TRACHEOBRONCHIAL TREE, VIA NATURAL OR ARTIFICIAL OPENING ENDOSCOPIC: ICD-10-PCS | Performed by: INTERNAL MEDICINE

## 2019-06-07 RX ADMIN — Medication PRN EACH: at 21:38

## 2019-06-07 RX ADMIN — MIDAZOLAM HYDROCHLORIDE ONE MG: 1 INJECTION, SOLUTION INTRAMUSCULAR; INTRAVENOUS at 13:13

## 2019-06-07 RX ADMIN — HEPARIN SODIUM SCH: 5000 INJECTION, SOLUTION INTRAVENOUS; SUBCUTANEOUS at 22:26

## 2019-06-07 RX ADMIN — PIPERACILLIN AND TAZOBACTAM SCH MLS/HR: 3; .375 INJECTION, POWDER, LYOPHILIZED, FOR SOLUTION INTRAVENOUS; PARENTERAL at 09:06

## 2019-06-07 RX ADMIN — POLYETHYLENE GLYCOL 3350 SCH: 17 POWDER, FOR SOLUTION ORAL at 09:07

## 2019-06-07 RX ADMIN — Medication SCH: at 05:16

## 2019-06-07 RX ADMIN — Medication SCH: at 18:38

## 2019-06-07 RX ADMIN — HEPARIN SODIUM SCH: 5000 INJECTION, SOLUTION INTRAVENOUS; SUBCUTANEOUS at 18:38

## 2019-06-07 RX ADMIN — MIDAZOLAM HYDROCHLORIDE ONE MG: 1 INJECTION, SOLUTION INTRAMUSCULAR; INTRAVENOUS at 13:12

## 2019-06-07 RX ADMIN — MIDAZOLAM HYDROCHLORIDE ONE MG: 1 INJECTION, SOLUTION INTRAMUSCULAR; INTRAVENOUS at 13:14

## 2019-06-07 RX ADMIN — FENTANYL CITRATE ONE MCG: 50 INJECTION INTRAMUSCULAR; INTRAVENOUS at 13:12

## 2019-06-07 RX ADMIN — IPRATROPIUM BROMIDE AND ALBUTEROL SULFATE SCH ML: 2.5; .5 SOLUTION RESPIRATORY (INHALATION) at 08:23

## 2019-06-07 RX ADMIN — PIPERACILLIN AND TAZOBACTAM SCH: 3; .375 INJECTION, POWDER, LYOPHILIZED, FOR SOLUTION INTRAVENOUS; PARENTERAL at 18:39

## 2019-06-07 RX ADMIN — HEPARIN SODIUM SCH: 5000 INJECTION, SOLUTION INTRAVENOUS; SUBCUTANEOUS at 05:16

## 2019-06-07 RX ADMIN — PIPERACILLIN AND TAZOBACTAM SCH MLS/HR: 3; .375 INJECTION, POWDER, LYOPHILIZED, FOR SOLUTION INTRAVENOUS; PARENTERAL at 21:28

## 2019-06-07 RX ADMIN — Medication SCH ML: at 21:38

## 2019-06-07 RX ADMIN — FENTANYL CITRATE ONE MCG: 50 INJECTION INTRAMUSCULAR; INTRAVENOUS at 13:23

## 2019-06-07 RX ADMIN — IPRATROPIUM BROMIDE AND ALBUTEROL SULFATE SCH ML: 2.5; .5 SOLUTION RESPIRATORY (INHALATION) at 00:33

## 2019-06-07 RX ADMIN — MIDAZOLAM HYDROCHLORIDE ONE MG: 1 INJECTION, SOLUTION INTRAMUSCULAR; INTRAVENOUS at 13:10

## 2019-06-07 RX ADMIN — IPRATROPIUM BROMIDE AND ALBUTEROL SULFATE SCH: 2.5; .5 SOLUTION RESPIRATORY (INHALATION) at 16:38

## 2019-06-07 RX ADMIN — MIDAZOLAM HYDROCHLORIDE ONE MG: 1 INJECTION, SOLUTION INTRAMUSCULAR; INTRAVENOUS at 13:16

## 2019-06-07 RX ADMIN — MIDAZOLAM HYDROCHLORIDE ONE MG: 1 INJECTION, SOLUTION INTRAMUSCULAR; INTRAVENOUS at 12:43

## 2019-06-07 RX ADMIN — MIDAZOLAM HYDROCHLORIDE ONE MG: 1 INJECTION, SOLUTION INTRAMUSCULAR; INTRAVENOUS at 13:11

## 2019-06-07 RX ADMIN — MIDAZOLAM HYDROCHLORIDE ONE MG: 1 INJECTION, SOLUTION INTRAMUSCULAR; INTRAVENOUS at 13:18

## 2019-06-07 RX ADMIN — MIDAZOLAM HYDROCHLORIDE ONE MG: 1 INJECTION, SOLUTION INTRAMUSCULAR; INTRAVENOUS at 13:22

## 2019-06-07 RX ADMIN — PIPERACILLIN AND TAZOBACTAM SCH MLS/HR: 3; .375 INJECTION, POWDER, LYOPHILIZED, FOR SOLUTION INTRAVENOUS; PARENTERAL at 03:18

## 2019-06-07 RX ADMIN — Medication PRN EACH: at 05:54

## 2019-06-07 RX ADMIN — MIDAZOLAM HYDROCHLORIDE ONE MG: 1 INJECTION, SOLUTION INTRAMUSCULAR; INTRAVENOUS at 13:17

## 2019-06-07 RX ADMIN — FENTANYL CITRATE ONE MCG: 50 INJECTION INTRAMUSCULAR; INTRAVENOUS at 13:20

## 2019-06-07 RX ADMIN — PANTOPRAZOLE SODIUM SCH MG: 40 INJECTION, POWDER, FOR SOLUTION INTRAVENOUS at 09:07

## 2019-06-07 NOTE — OPERATIVE REPORT E
Operative Report



NAME: EJ TERRY JR

MRN:  [id number]          : 1985 AGE:  34Y

DATE OF SURGERY: 2019               ROOM: 335





INDICATION FOR PROCEDURE:

The patient is a 34-year-old male who came in with nausea, vomiting, and a

large right upper lobe mass about 6 x 4 cm size.  Initially treated for

pneumonia with IV Zosyn and vancomycin.  Appeared to improve.  White count

went down from 12,000 to 10,000.  The patient is rescheduled for flexible

bronchoscopy with lung biopsy in the operating room. The patient had an

attempt for bronchoscopy in the endoscopy unit earlier; however, the

patient vomited the first time after getting the lidocaine nebulizer, and

vomited the second time while sedated on 5.5 mg of Versed and 75 mcg of

fentanyl.  Was able to protect his airway.  Suctioned the gastric secretions

immediately and reinspected the airway and looks clean and no apparent

aspiration noted.  The flexible bronchoscopy under conscious (moderate )  
sedation was

aborted, and patient was scheduled for flexible bronchoscopy with anesthesia 
support

with patient endotracheally intubated and mechanically ventilated

during the general anesthesia.



SURGEON:

FAROOQ THOMPSON M.D.



ANESTHESIA SERVICE:   DR ROTH & LEONARDO CHEW



DETAILS OF PROCEDURE:

Flexible bronchoscope was inserted through endotracheal tube adapter, and the 
main shira appeared normal. 

Left main stem bronchus and left segmental airway look normal.  There are

some purulent secretions noted on the left upper lobe and the left lower

lobe.  A moderate amount of purulent endotracheal tube secretions was

suctioned from the right upper lobe.  The apical bronchus appeared

stenotic.  Purulent bronchial  secretions were suctioned until it is clear.  No

apparent endobronchial tumor was noted on the right upper lobe bronchus. 

Bronchial biopsy x4 was performed in the apical segment of the right upper

lobe under flouroscopic guidance.  Some small amount of bleeding was noted,

which appeared to resolve after cold saline application.  No active

bleeding was noted at the termination of procedure.  No apparent pneumothorax 
was noted after the bronchoscopy. 





DICTATING PHYSICIAN:  FAROOQ THOMPSON MD,ANDREW,MPH





[id number]                  DT: 2019    1903

PHY#: [id number]            DD: 2019    1640

ID:   [id number]           JOB#: [id number]       ACCT: [id number]



cc:FAROOQ THOMPSON M.D.

>







VENITA

## 2019-06-07 NOTE — RADIOLOGY REPORT (SQ)
EXAM DESCRIPTION:  CHEST SINGLE VIEW



COMPLETED DATE/TIME:  6/7/2019 4:58 pm



REASON FOR STUDY:  S/P Lung Biopsy, right upper lobe



COMPARISON:  CT 6/4/2019 chest x-ray 1/14/2018



EXAM PARAMETERS:  NUMBER OF VIEWS: One view.

TECHNIQUE: Single frontal radiographic view of the chest acquired.

RADIATION DOSE: NA

LIMITATIONS: None.



FINDINGS:  LUNGS AND PLEURA: Considerable opacification in the right upper lobe.  No pneumothorax.

MEDIASTINUM AND HILAR STRUCTURES: No masses.  Contour normal.

HEART AND VASCULAR STRUCTURES: Heart normal in size.  Normal vasculature.

BONES: No acute findings.

HARDWARE: None in the chest.

OTHER: No other significant finding.



IMPRESSION:  No pneumothorax status post lung biopsy.



TECHNICAL DOCUMENTATION:  JOB ID:  [id number]

 2011 [employer]- All Rights Reserved



Reading location - IP/workstation name: [email or web address]

## 2019-06-07 NOTE — PDOC PROGRESS REPORT
Subjective


Progress Note for:: 06/07/19


Subjective:: 


Patient is seen and examined at bedside.  He is awake alert oriented.  No new 

complaints.  Patient is scheduled for bronchoscopy this morning.





Reason For Visit: 


LUNG MASS PNEUMONIA








Physical Exam


Vital Signs: 


                                        











Temp Pulse Resp BP Pulse Ox


 


 98.8 F   92   22 H  127/66 H  93 


 


 06/07/19 07:19  06/07/19 13:55  06/07/19 13:55  06/07/19 13:55  06/07/19 13:55








                                 Intake & Output











 06/06/19 06/07/19 06/08/19





 06:59 06:59 06:59


 


Intake Total 1140 1550 700


 


Balance 1140 1550 700


 


Weight 119.4 kg 122 kg 











General appearance: PRESENT: no acute distress


Head exam: PRESENT: atraumatic


Neck exam: ABSENT: carotid bruit, JVD, lymphadenopathy, thyromegaly


Respiratory exam: PRESENT: crackles - Right lung, decreased breath sounds


Cardiovascular exam: PRESENT: RRR.  ABSENT: diastolic murmur, rubs, systolic 

murmur


Neurological exam: PRESENT: alert, awake, oriented to person, oriented to place,

oriented to time, oriented to situation





Results


Laboratory Results: 


                                        





                                 06/06/19 21:12 





                                 06/06/19 21:12 





                                        











  06/06/19 06/06/19





  21:12 21:12


 


WBC  10.2 


 


RBC  4.54 


 


Hgb  12.9 L 


 


Hct  37.5 L 


 


MCV  83 


 


MCH  28.4 


 


MCHC  34.4 


 


RDW  13.1 


 


Plt Count  348 


 


Seg Neutrophils %  70.0 


 


Lymphocytes %  16.7 


 


Monocytes %  12.1 


 


Eosinophils %  0.8 


 


Basophils %  0.4 


 


Absolute Neutrophils  7.1 


 


Absolute Lymphocytes  1.7 


 


Absolute Monocytes  1.2 


 


Absolute Eosinophils  0.1 


 


Absolute Basophils  0.0 


 


Sodium   141.8


 


Potassium   3.9


 


Chloride   99


 


Carbon Dioxide   32 H


 


Anion Gap   11


 


BUN   10


 


Creatinine   1.25


 


Est GFR ( Amer)   > 60


 


Est GFR (Non-Af Amer)   > 60


 


Glucose   115 H


 


Calcium   9.6


 


Total Bilirubin   0.6


 


AST   26


 


ALT   40


 


Alkaline Phosphatase   119


 


Total Protein   7.0


 


Albumin   3.9








                                        





06/05/19 08:37   Sputum   Fungal Smear - Final


06/05/19 08:37   Sputum   Fungal Smear - Final








Impressions: 


                                        





Acute Abdomen Series  06/04/19 16:36


IMPRESSION:  1.  Large masslike opacity of the suprahilar right upper lobe 

measuring at least 6.5 cm.  Recommend contrast-enhanced CT to further evaluate.


2.  Nonobstructive pattern of bowel gas with gas and stool present to the 

rectum.  No free air in the abdomen.


 








Chest CT  06/04/19 17:44


IMPRESSION:  There is a dense, masslike consolidation of the apical right upper 

lobe in keeping with findings of prior radiograph.  This may be infectious in 

nature with the appropriate clinical presentation, however underlying mass is 

not excluded.  Recommend follow-up radiographs in 6 to 8 weeks to document 

complete resolution.  Bronchoscopic or percutaneous tissue sampling may be 

pursued if there is no infectious presentation.


 














Assessment and Plan





- Diagnosis


(1) Right upper lobe pneumonia


Is this a current diagnosis for this admission?: Yes   


Plan: 


Patient is being treated with Zosyn as ordered by pulmonologist.


He is scheduled for bronchoscopy.








(2) Constipation


Is this a current diagnosis for this admission?: Yes   


Plan: 


Resolved








(3) History of tuberculosis


Is this a current diagnosis for this admission?: Yes   


Plan: 


Most probably it is latent tuberculosis since patient did not have any signs 

symptoms compatible with tuberculosis.  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 

[place].

## 2019-06-07 NOTE — OPERATIVE REPORT E
Operative Report



NAME: EJ TERRY JR

MRN:  [id number]          : 1985 AGE:  34Y

DATE OF SURGERY: 2019              ROOM: 335



HISTORY:

The patient is a 34-year-old male who came in with nausea, vomiting, and a

right upper lobe mass, treated for pneumonia.  Sputum AFB smears done x3

negative.  Scheduled for flexible bronchoscopy today to the right upper

lobe mass, possible malignancy.



INDICATION:

Right upper lobe mass about 6 x 4 cm.



PROCEDURE:

Flexible bronchoscopy with bronchial washings and possible transbronchial

lung biopsy and brushing.



SURGEON:

FAROOQ THOMPSON M.D.



PROCEDURE:

Consent was obtained from the patient.  The patient verbalized

understanding of the indications, risks, and potential complications of

the procedure.  The patient was given 5 mL of 2% lidocaine solution via

nebulizer.  He had one episode of vomiting and nausea but was given Zofran

4 mg, appeared to tolerate it well, and given Versed 1 mg to help the

patient relax due to anxiety.  A 2% lidocaine solution was applied using a

cotton swab on the tonsillopharyngeal area.  Hurricane spray was also

applied x3 on the oropharyngeal area.  The lidocaine swab confirmed

adequate oropharyngeal topical anesthesia.  The patient was given

Versed 1 mg IV in increments of 0.5 mg to a total dose of 5.5 mg.  The

patient was given also Fentanyl in increments of 25 mcg to a total dose of

75 mcg.  A total of 7 mL of 2% lidocaine was applied to the oropharyngeal

area.  Flexible bronchoscope was inserted.  Vocal cords were visualized,

but the patient vomited greenish gastric secretions, able to suction it

quickly and tilt the patient's head to the right.  We inspected the upper

airway again, including up to the vocal cords, and there was no apparent

gastric secretions or gastric  secretions in the oropharyngeal and supraglottic 
area.  The procedure

was aborted.  The patient will be scheduled to have the bronchoscopy and

possible biopsy in the operating room with anesthesia support. 

Anesthesia was consulted.  The patient was scheduled for bronchoscopy at

3:30 p.m.



DICTATING PHYSICIAN:  FAROOQ THOMPSON MD,ANDREW,MPH





[id number]                  DT: 2019    1536

PHY#: [id number]            DD: 2019    1334

ID:   [id number]           JOB#: [id number]       ACCT: [id number]



cc:FAROOQ THOMPSON M.D.

>







MTDD

## 2019-06-08 VITALS — SYSTOLIC BLOOD PRESSURE: 150 MMHG | DIASTOLIC BLOOD PRESSURE: 82 MMHG

## 2019-06-08 RX ADMIN — HEPARIN SODIUM SCH: 5000 INJECTION, SOLUTION INTRAVENOUS; SUBCUTANEOUS at 06:29

## 2019-06-08 RX ADMIN — Medication SCH: at 06:29

## 2019-06-08 RX ADMIN — Medication PRN EACH: at 03:10

## 2019-06-08 RX ADMIN — IPRATROPIUM BROMIDE AND ALBUTEROL SULFATE SCH ML: 2.5; .5 SOLUTION RESPIRATORY (INHALATION) at 00:45

## 2019-06-08 RX ADMIN — PIPERACILLIN AND TAZOBACTAM SCH MLS/HR: 3; .375 INJECTION, POWDER, LYOPHILIZED, FOR SOLUTION INTRAVENOUS; PARENTERAL at 02:26

## 2019-06-08 RX ADMIN — POLYETHYLENE GLYCOL 3350 SCH: 17 POWDER, FOR SOLUTION ORAL at 09:42

## 2019-06-08 RX ADMIN — PIPERACILLIN AND TAZOBACTAM SCH MLS/HR: 3; .375 INJECTION, POWDER, LYOPHILIZED, FOR SOLUTION INTRAVENOUS; PARENTERAL at 08:50

## 2019-06-08 RX ADMIN — IPRATROPIUM BROMIDE AND ALBUTEROL SULFATE SCH: 2.5; .5 SOLUTION RESPIRATORY (INHALATION) at 08:38

## 2019-06-08 NOTE — PROGRESS NOTE E
Progress Note



NAME: EJ TERRY JR

MRN: [id number]

: 1985             AGE: 34Y

DATE:  2019           ROOM: 335



SUBJECTIVE:

The patient is a 34-year-old who came with a large right upper lobe mass,

fever 102 degrees Fahrenheit, chills, nausea, vomiting.  Treated

empirically for pneumonia.  Given IV Zosyn and vancomycin.  Had a

transbronchial lung biopsy yesterday.  The patient tolerated the procedure

well.  The patient has been afebrile for the last 2 to 3 days.  Denies any

increased cough or purulent sputum production or hemoptysis since last

night after the bronchoscopy.  Denies any chest pain.  Denies any

worsening dyspnea.  Denies any nausea, *------* nausea, vomiting

overnight.



OBJECTIVE:

GENERAL:  The patient is awake, alert, coherent, oriented x3.



VITAL SIGNS:  Temperature of 98.6 with a T-max of 99.6 and pulse rate 103,

blood pressure is 150/82, respiratory is 20, saturation is 97%.



EYES:  No jaundice or pallor.



EARS, NOSE, AND THROAT:  No ear drainage.  No nasal discharge.



CHEST AND LUNGS:  No wheezing, no rhonchi, no coarse crackles.



CARDIOVASCULAR:  S1, S2 distinct.  Normal rate and regular rhythm.



ABDOMEN:  Flabby, positive bowel sounds, soft, nondistended, nontender.



EXTREMITIES:  No joint swelling, no cellulitis.



LABORATORY DATA:

There are no labs this morning.  Cultures; fungal, AFB, and bacterial

cultures they are still pending.  Cytology and lung biopsy also pending.



ASSESSMENT:

1.  RIGHT UPPER LOBE MASS AND POSSIBLE SCARRING, *------* INFECTION/

PNEUMONIC PROCESS/UNDERLYING MALIGNANCY COULD NOT BE COMPLETELY EXCLUDED.

2.  HISTORY OF PULMONARY TUBERCULOSIS IN  AND TREATED FOR 9 MONTHS. He

had a chest x-ray on 2018 showing absence of mass like density

in the right upper lobe.  Right upper lobe mass is worrisome for

underlying malignancy.



PLAN/RECOMMENDATION:

1.  Recommend discontinue the IV Zosyn.  May place the patient on Levaquin

500 mg tablet p.o. daily for 7 to 10 more days.

2.  Pulmonary clinic follow up in 1 to 2 weeks after hospital discharge. 

We will sign off today.

3.  The patient will be able to go home today.



PLAN:

@





DICTATING PHYSICIAN:  FAROOQ THOMPSON M.D.





[id number]                  DT: 2019

PHY#: [id number]            DD: 2019    160

ID:   [id number]           JOB#: [id number]       ACCT: [id number]



cc:

>

## 2019-06-10 NOTE — RADIOLOGY REPORT (SQ)
EXAM DESCRIPTION:  CHEST SINGLE VIEW



COMPLETED DATE/TIME:  6/10/2019 7:58 am



REASON FOR STUDY:  BRONCH



COMPARISON:  None.



FLUOROSCOPY TIME:  0.6 minutes.

2 images saved to PACS.



TECHNIQUE:  Intra-operative images acquired during surgical procedure to evaluate progress.

NUMBER OF IMAGES: 2 images.



LIMITATIONS:  None.



FINDINGS:  Images of the chest acquired during bronchoscopy.



IMPRESSION:  IMAGE(S) OBTAINED DURING PROCEDURE.



COMMENT:  Quality :  Final reports for procedures using fluoroscopy that document radiation exp
osure indices, or exposure time and number of fluorographic images (if radiation exposure indices are
 not available)

Please consult full operative report of the attending physician for description of the procedure.



TECHNICAL DOCUMENTATION:  JOB ID:  [id number]

 2011 [employer]- All Rights Reserved



Reading location - IP/workstation name: [email or web address]

## 2019-06-10 NOTE — PDOC DISCHARGE SUMMARY
General





- Admit/Disc Date/PCP


Admission Date/Primary Care Provider: 


  06/04/19 19:52





  RACHEL KAPADIA





Discharge Date: 06/08/19





- Discharge Diagnosis


(1) Right upper lobe pneumonia


Is this a current diagnosis for this admission?: Yes   





(2) Constipation


Is this a current diagnosis for this admission?: Yes   





(3) History of tuberculosis


Is this a current diagnosis for this admission?: Yes   





- Additional Information


Resuscitation Status: Full Code


Prescriptions: 


Levofloxacin [Levaquin 500 mg Tablet] 500 mg PO DAILY #10 tablet


Home Medications: 








Baclofen [Baclofen 10 mg Tablet] 10 mg PO TID PRN 06/08/19 


Desmopressin Acetate 1 tab PO QHS 06/08/19 


Levofloxacin [Levaquin 500 mg Tablet] 500 mg PO DAILY #10 tablet 06/08/19 


Omeprazole 2 cap PO DAILY 06/08/19 











History of Present Illness


History of Present Illness: 


EJ TERRY JR is a 34 year old male with a past medical history of treated tuber

culosis in 2012 who presents with 3 days of fever and cough prompting evaluation

in the emergency room where he is found to have a large mass in the right upper 

lobe, leukocytosis, and fever.  He is unaware of a history of mass or 

tuberculoma.  Pulmonology is consulted recommending evaluation for tuberculosis 

but empiric antibiotics for community-acquired pneumonia with vancomycin and 

Zosyn.  He denies recent antibiotics and he is referred to the hospitalist for 

admission











Hospital Course


Hospital Course: 


This is 74 years old black male patient with no significant past medical history

except for had been treated for tuberculosis in 2012.  Presented with 3 days 

history of cough of sputum, fever and mild shortness of breath.  His CT scan and

chest x-ray revealed dense masslike consolidation in the right upper lobe.  He 

has also mild leukocytosis of 13,000 which is today it is 10,000.  Patient 

reported some improvement.  He has been started on vancomycin and Zosyn 

empirically.  His sputum Gram stain culture and AFB are pending.  Of note 

patient had been assigned in [facility] 2012 and upon his return to [place] 

diagnosed with most probably with latent tuberculosis and treated with anti-

tuberculosis.  Patient reports his TB was diagnosed based on PPD otherwise he 

did not have signs and symptoms of tuberculosis.  Patient has been on Zosyn for 

right upper lobe pneumonia.  3 sets AFB smears are negative for acid-fast 

bacilli.  Yesterday he had bronchoscopy and some purulent material has been 

aspirated and subjected to culture Gram stain and AFB.  Since clinically 

remained stable.  His vitals and blood works are within normal limits.  This mo

rning patient reevaluated by Dr. Van who cleared him for discharge and 

recommended follow-up with him in his office.








Physical Exam


Vital Signs: 


                                        











Temp Pulse Resp BP Pulse Ox


 


 98.6 F   103 H  20   143/70 H  97 


 


 06/08/19 07:14  06/08/19 07:14  06/08/19 07:14  06/08/19 07:14  06/08/19 07:14








                                 Intake & Output











 06/07/19 06/08/19 06/09/19





 06:59 06:59 06:59


 


Intake Total 1550 1600 100


 


Balance 1550 1600 100


 


Weight 122 kg 121 kg 











General appearance: PRESENT: no acute distress, well-developed, well-nourished


Head exam: PRESENT: atraumatic, normocephalic


Eye exam: PRESENT: conjunctiva pink, EOMI, PERRLA.  ABSENT: scleral icterus


Ear exam: PRESENT: normal external ear exam


Mouth exam: PRESENT: moist, tongue midline


Neck exam: ABSENT: carotid bruit, JVD, lymphadenopathy, thyromegaly


Respiratory exam: PRESENT: crackles, decreased breath sounds - Right lung


Cardiovascular exam: PRESENT: RRR.  ABSENT: diastolic murmur, rubs, systolic 

murmur


Pulses: PRESENT: normal dorsalis pedis pul


Vascular exam: PRESENT: normal capillary refill


GI/Abdominal exam: PRESENT: normal bowel sounds, soft.  ABSENT: distended, 

guarding, mass, organolmegaly, rebound, tenderness


Rectal exam: PRESENT: deferred


Extremities exam: PRESENT: full ROM.  ABSENT: calf tenderness, clubbing, pedal 

edema


Neurological exam: PRESENT: alert, awake, oriented to person, oriented to place,

oriented to time, oriented to situation, CN II-XII grossly intact.  ABSENT: 

motor sensory deficit


Psychiatric exam: PRESENT: appropriate affect, normal mood.  ABSENT: homicidal 

ideation, suicidal ideation


Skin exam: PRESENT: dry, intact, warm.  ABSENT: cyanosis, rash





Results


Laboratory Results: 


                                        





                                 06/06/19 21:12 





                                 06/06/19 21:12 





                                        





06/05/19 05:55   Sputum   Gram Stain - Final


06/05/19 05:55   Sputum   Sputum Culture - Final


                            NORMAL YARA


06/05/19 08:37   Sputum   Fungal Smear - Final


06/05/19 08:37   Sputum   Fungal Smear - Final








Impressions: 


                                        





Acute Abdomen Series  06/04/19 16:36


IMPRESSION:  1.  Large masslike opacity of the suprahilar right upper lobe 

measuring at least 6.5 cm.  Recommend contrast-enhanced CT to further evaluate.


2.  Nonobstructive pattern of bowel gas with gas and stool present to the 

rectum.  No free air in the abdomen.


 








Chest CT  06/04/19 17:44


IMPRESSION:  There is a dense, masslike consolidation of the apical right upper 

lobe in keeping with findings of prior radiograph.  This may be infectious in 

nature with the appropriate clinical presentation, however underlying mass is 

not excluded.  Recommend follow-up radiographs in 6 to 8 weeks to document 

complete resolution.  Bronchoscopic or percutaneous tissue sampling may be 

pursued if there is no infectious presentation.


 








Chest X-Ray  06/07/19 16:34


IMPRESSION:  No pneumothorax status post lung biopsy.


 














Qualifiers





- *


PATIENT BEING DISCHARGED WITH ANY OF THE FOLLOWING DIAGNOSIS: No





Acute Heart Failure





- **


Is this a Heart Failure Patient?: No


LVEF < 40%?: No- if no continue to question #3


3. Anticoagulant therapy for permanect/persistent/paraoxysmal Afib or Aflutter: 

N/A

## 2019-11-04 ENCOUNTER — HOSPITAL ENCOUNTER (OUTPATIENT)
Dept: HOSPITAL 62 - RAD | Age: 34
End: 2019-11-04
Attending: INTERNAL MEDICINE
Payer: OTHER GOVERNMENT

## 2019-11-04 DIAGNOSIS — R91.8: Primary | ICD-10-CM

## 2019-11-04 DIAGNOSIS — R91.1: ICD-10-CM

## 2019-11-04 PROCEDURE — 71250 CT THORAX DX C-: CPT

## 2019-11-04 NOTE — RADIOLOGY REPORT (SQ)
EXAM DESCRIPTION:  CT CHEST WITHOUT



COMPLETED DATE/TIME:  11/4/2019 8:23 am



REASON FOR STUDY:  OTHER NONSPECIFIC ABN FINDING OF LUNG (R91.8), SOLITARY PULMONARY NODULE (R R91.8 
 OTHER NONSPECIFIC ABNORMAL FINDING OF LUNG FIELD



COMPARISON:  6/4/2019



TECHNIQUE:  CT scan performed of the chest without intravenous contrast.  Images reviewed with lung, 
soft tissue and bone windows.  Reconstructed coronal and sagittal MPR images reviewed.  All images st
ored on PACS.

All CT scanners at this facility use dose modulation, iterative reconstruction, and/or weight based d
osing when appropriate to reduce radiation dose to as low as reasonably achievable (ALARA).

[facility]: Dose Right  [facility]: CareDose    [facility]: Dose Right    [facility]: Teradose 4D    [facility]: Smart Clean [place]



RADIATION DOSE:  CT Rad equipment meets quality standard of care and radiation dose reduction techniq
ues were employed. CTDIvol: 17.6 mGy. DLP: 669 mGy-cm. mGy.



LIMITATIONS:  No technical limitations.



FINDINGS:  LUNGS AND PLEURA: There is mild scarring in the right apex.  The masslike consolidation pr
esent on the prior study is no longer evident.  No pulmonary nodule is present.  No pleural effusion 
is seen.  There is no pulmonary infiltrate.

HILAR AND MEDIASTINAL STRUCTURES: No identified masses or abnormal nodes.  No obvious aneurysm.

HEART AND VASCULAR STRUCTURES: No aneurysm.  No pericardial effusion.

UPPER ABDOMEN: No significant findings.  Limited exam.

THYROID AND OTHER SOFT TISSUES: No masses.  No adenopathy.

BONES: No significant finding.

HARDWARE: None in the chest.

OTHER: No other significant findings.



IMPRESSION:  No significant acute finding.  Resolution of the masslike consolidation present previous
ly in the right upper lobe.



TECHNICAL DOCUMENTATION:  JOB ID:  [id number]

Quality ID # [id number]: Final reports with documentation of one or more dose reduction techniques (e.g., Au
tomated exposure control, adjustment of the mA and/or kV according to patient size, use of iterative 
reconstruction technique)

 2011 [employer]- All Rights Reserved



Reading location - IP/workstation name: [email or web address]